# Patient Record
Sex: FEMALE | Race: BLACK OR AFRICAN AMERICAN | URBAN - METROPOLITAN AREA
[De-identification: names, ages, dates, MRNs, and addresses within clinical notes are randomized per-mention and may not be internally consistent; named-entity substitution may affect disease eponyms.]

---

## 2017-07-12 ENCOUNTER — DOCUMENTATION ONLY (OUTPATIENT)
Dept: BARIATRICS/WEIGHT MGMT | Age: 22
End: 2017-07-12

## 2017-07-12 NOTE — PROGRESS NOTES
7/12/17:  Patient did not show for her nutrition class. She was left a voicemail to reschedule.     Louis Watts MS RD

## 2017-08-02 ENCOUNTER — DOCUMENTATION ONLY (OUTPATIENT)
Dept: BARIATRICS/WEIGHT MGMT | Age: 22
End: 2017-08-02

## 2017-08-02 NOTE — PROGRESS NOTES
8/2/17:  Patient did not show for her weight loss trial class. This is her 2nd no show. She was contacted to be rescheduled.     Dahlia Cody MS RD